# Patient Record
Sex: FEMALE | Race: WHITE | NOT HISPANIC OR LATINO
[De-identification: names, ages, dates, MRNs, and addresses within clinical notes are randomized per-mention and may not be internally consistent; named-entity substitution may affect disease eponyms.]

---

## 2017-03-29 ENCOUNTER — APPOINTMENT (OUTPATIENT)
Dept: DERMATOLOGY | Facility: CLINIC | Age: 47
End: 2017-03-29

## 2017-05-11 ENCOUNTER — APPOINTMENT (OUTPATIENT)
Dept: OBGYN | Facility: CLINIC | Age: 47
End: 2017-05-11

## 2017-05-11 VITALS
SYSTOLIC BLOOD PRESSURE: 145 MMHG | HEART RATE: 74 BPM | HEIGHT: 67 IN | WEIGHT: 187 LBS | BODY MASS INDEX: 29.35 KG/M2 | DIASTOLIC BLOOD PRESSURE: 87 MMHG

## 2017-05-11 DIAGNOSIS — Z87.09 PERSONAL HISTORY OF OTHER DISEASES OF THE RESPIRATORY SYSTEM: ICD-10-CM

## 2017-05-11 DIAGNOSIS — Z86.79 PERSONAL HISTORY OF OTHER DISEASES OF THE CIRCULATORY SYSTEM: ICD-10-CM

## 2017-05-11 DIAGNOSIS — Z82.49 FAMILY HISTORY OF ISCHEMIC HEART DISEASE AND OTHER DISEASES OF THE CIRCULATORY SYSTEM: ICD-10-CM

## 2017-05-11 DIAGNOSIS — Z82.3 FAMILY HISTORY OF STROKE: ICD-10-CM

## 2017-05-11 DIAGNOSIS — Z87.01 PERSONAL HISTORY OF PNEUMONIA (RECURRENT): ICD-10-CM

## 2017-05-11 DIAGNOSIS — Z78.9 OTHER SPECIFIED HEALTH STATUS: ICD-10-CM

## 2017-05-11 RX ORDER — LISINOPRIL 10 MG/1
10 TABLET ORAL
Refills: 0 | Status: ACTIVE | COMMUNITY

## 2017-05-11 RX ORDER — HYDROCHLOROTHIAZIDE 12.5 MG/1
12.5 TABLET ORAL
Refills: 0 | Status: ACTIVE | COMMUNITY

## 2017-05-15 LAB — HPV HIGH+LOW RISK DNA PNL CVX: NEGATIVE

## 2017-05-17 LAB — CYTOLOGY CVX/VAG DOC THIN PREP: NORMAL

## 2017-09-29 ENCOUNTER — APPOINTMENT (OUTPATIENT)
Dept: DERMATOLOGY | Facility: CLINIC | Age: 47
End: 2017-09-29
Payer: COMMERCIAL

## 2017-09-29 PROCEDURE — 99213 OFFICE O/P EST LOW 20 MIN: CPT

## 2018-03-30 ENCOUNTER — APPOINTMENT (OUTPATIENT)
Dept: DERMATOLOGY | Facility: CLINIC | Age: 48
End: 2018-03-30
Payer: COMMERCIAL

## 2018-03-30 ENCOUNTER — RESULT REVIEW (OUTPATIENT)
Age: 48
End: 2018-03-30

## 2018-03-30 PROCEDURE — 99214 OFFICE O/P EST MOD 30 MIN: CPT | Mod: 25

## 2018-03-30 PROCEDURE — 11100 BX SKIN SUBCUTANEOUS&/MUCOUS MEMBRANE 1 LESION: CPT

## 2018-03-30 PROCEDURE — 11101 BIOPSY SKIN SUBQ&/MUCOUS MEMBRANE EA ADDL LESN: CPT

## 2018-04-19 ENCOUNTER — TRANSCRIPTION ENCOUNTER (OUTPATIENT)
Age: 48
End: 2018-04-19

## 2018-09-10 ENCOUNTER — APPOINTMENT (OUTPATIENT)
Dept: DERMATOLOGY | Facility: CLINIC | Age: 48
End: 2018-09-10
Payer: COMMERCIAL

## 2018-09-10 PROCEDURE — 99213 OFFICE O/P EST LOW 20 MIN: CPT

## 2018-10-08 ENCOUNTER — APPOINTMENT (OUTPATIENT)
Dept: OBGYN | Facility: CLINIC | Age: 48
End: 2018-10-08
Payer: COMMERCIAL

## 2018-10-08 VITALS
WEIGHT: 185 LBS | HEART RATE: 80 BPM | DIASTOLIC BLOOD PRESSURE: 92 MMHG | HEIGHT: 67 IN | SYSTOLIC BLOOD PRESSURE: 148 MMHG | BODY MASS INDEX: 29.03 KG/M2

## 2018-10-08 DIAGNOSIS — Z01.419 ENCOUNTER FOR GYNECOLOGICAL EXAMINATION (GENERAL) (ROUTINE) W/OUT ABNORMAL FINDINGS: ICD-10-CM

## 2018-10-08 PROCEDURE — 99212 OFFICE O/P EST SF 10 MIN: CPT | Mod: 25

## 2018-10-08 PROCEDURE — 99396 PREV VISIT EST AGE 40-64: CPT

## 2018-10-08 PROCEDURE — 82270 OCCULT BLOOD FECES: CPT

## 2018-10-10 LAB — HPV HIGH+LOW RISK DNA PNL CVX: NOT DETECTED

## 2018-10-15 LAB — CYTOLOGY CVX/VAG DOC THIN PREP: NORMAL

## 2018-11-20 DIAGNOSIS — N64.89 OTHER SPECIFIED DISORDERS OF BREAST: ICD-10-CM

## 2018-12-17 ENCOUNTER — APPOINTMENT (OUTPATIENT)
Dept: DERMATOLOGY | Facility: CLINIC | Age: 48
End: 2018-12-17

## 2019-02-04 ENCOUNTER — APPOINTMENT (OUTPATIENT)
Dept: DERMATOLOGY | Facility: CLINIC | Age: 49
End: 2019-02-04
Payer: COMMERCIAL

## 2019-02-04 PROCEDURE — 99213 OFFICE O/P EST LOW 20 MIN: CPT

## 2019-04-04 ENCOUNTER — APPOINTMENT (OUTPATIENT)
Dept: OBGYN | Facility: CLINIC | Age: 49
End: 2019-04-04
Payer: COMMERCIAL

## 2019-04-04 VITALS
BODY MASS INDEX: 28.56 KG/M2 | DIASTOLIC BLOOD PRESSURE: 100 MMHG | WEIGHT: 182 LBS | SYSTOLIC BLOOD PRESSURE: 150 MMHG | HEIGHT: 67 IN

## 2019-04-04 DIAGNOSIS — D25.9 LEIOMYOMA OF UTERUS, UNSPECIFIED: ICD-10-CM

## 2019-04-04 PROCEDURE — 99213 OFFICE O/P EST LOW 20 MIN: CPT

## 2019-04-04 NOTE — PHYSICAL EXAM
[Normal] : uterus [Moderate] : there was moderate vaginal bleeding [Uterine Adnexae] : were not tender and not enlarged [FreeTextEntry5] : Cervix with 3 cm smooth mass protruding through

## 2019-04-05 ENCOUNTER — OUTPATIENT (OUTPATIENT)
Dept: OUTPATIENT SERVICES | Facility: HOSPITAL | Age: 49
LOS: 1 days | End: 2019-04-05
Payer: COMMERCIAL

## 2019-04-05 ENCOUNTER — RESULT REVIEW (OUTPATIENT)
Age: 49
End: 2019-04-05

## 2019-04-05 VITALS
DIASTOLIC BLOOD PRESSURE: 69 MMHG | HEART RATE: 75 BPM | RESPIRATION RATE: 15 BRPM | SYSTOLIC BLOOD PRESSURE: 121 MMHG | OXYGEN SATURATION: 98 % | TEMPERATURE: 99 F

## 2019-04-05 VITALS
TEMPERATURE: 99 F | WEIGHT: 182.1 LBS | RESPIRATION RATE: 20 BRPM | SYSTOLIC BLOOD PRESSURE: 136 MMHG | HEART RATE: 75 BPM | DIASTOLIC BLOOD PRESSURE: 91 MMHG | HEIGHT: 67 IN

## 2019-04-05 VITALS
HEIGHT: 67 IN | OXYGEN SATURATION: 100 % | SYSTOLIC BLOOD PRESSURE: 126 MMHG | TEMPERATURE: 99 F | RESPIRATION RATE: 16 BRPM | WEIGHT: 182.1 LBS | DIASTOLIC BLOOD PRESSURE: 74 MMHG | HEART RATE: 72 BPM

## 2019-04-05 DIAGNOSIS — Z98.891 HISTORY OF UTERINE SCAR FROM PREVIOUS SURGERY: Chronic | ICD-10-CM

## 2019-04-05 DIAGNOSIS — Z01.818 ENCOUNTER FOR OTHER PREPROCEDURAL EXAMINATION: ICD-10-CM

## 2019-04-05 DIAGNOSIS — D25.9 LEIOMYOMA OF UTERUS, UNSPECIFIED: ICD-10-CM

## 2019-04-05 DIAGNOSIS — Z13.89 ENCOUNTER FOR SCREENING FOR OTHER DISORDER: ICD-10-CM

## 2019-04-05 DIAGNOSIS — Z29.9 ENCOUNTER FOR PROPHYLACTIC MEASURES, UNSPECIFIED: ICD-10-CM

## 2019-04-05 DIAGNOSIS — Z90.49 ACQUIRED ABSENCE OF OTHER SPECIFIED PARTS OF DIGESTIVE TRACT: Chronic | ICD-10-CM

## 2019-04-05 DIAGNOSIS — I10 ESSENTIAL (PRIMARY) HYPERTENSION: ICD-10-CM

## 2019-04-05 LAB
ANION GAP SERPL CALC-SCNC: 11 MMOL/L — SIGNIFICANT CHANGE UP (ref 5–17)
BUN SERPL-MCNC: 15 MG/DL — SIGNIFICANT CHANGE UP (ref 8–20)
CALCIUM SERPL-MCNC: 9.1 MG/DL — SIGNIFICANT CHANGE UP (ref 8.6–10.2)
CHLORIDE SERPL-SCNC: 101 MMOL/L — SIGNIFICANT CHANGE UP (ref 98–107)
CO2 SERPL-SCNC: 26 MMOL/L — SIGNIFICANT CHANGE UP (ref 22–29)
CREAT SERPL-MCNC: 0.71 MG/DL — SIGNIFICANT CHANGE UP (ref 0.5–1.3)
GLUCOSE SERPL-MCNC: 97 MG/DL — SIGNIFICANT CHANGE UP (ref 70–115)
HCG UR QL: NEGATIVE — SIGNIFICANT CHANGE UP
HCT VFR BLD CALC: 32.5 % — LOW (ref 37–47)
HGB BLD-MCNC: 10.4 G/DL — LOW (ref 12–16)
MCHC RBC-ENTMCNC: 25.6 PG — LOW (ref 27–31)
MCHC RBC-ENTMCNC: 32 G/DL — SIGNIFICANT CHANGE UP (ref 32–36)
MCV RBC AUTO: 79.9 FL — LOW (ref 81–99)
PLATELET # BLD AUTO: 254 K/UL — SIGNIFICANT CHANGE UP (ref 150–400)
POTASSIUM SERPL-MCNC: 3.4 MMOL/L — LOW (ref 3.5–5.3)
POTASSIUM SERPL-SCNC: 3.4 MMOL/L — LOW (ref 3.5–5.3)
RBC # BLD: 4.07 M/UL — LOW (ref 4.4–5.2)
RBC # FLD: 13.1 % — SIGNIFICANT CHANGE UP (ref 11–15.6)
SODIUM SERPL-SCNC: 138 MMOL/L — SIGNIFICANT CHANGE UP (ref 135–145)
WBC # BLD: 3.4 K/UL — LOW (ref 4.8–10.8)
WBC # FLD AUTO: 3.4 K/UL — LOW (ref 4.8–10.8)

## 2019-04-05 PROCEDURE — G0463: CPT

## 2019-04-05 PROCEDURE — 80048 BASIC METABOLIC PNL TOTAL CA: CPT

## 2019-04-05 PROCEDURE — 58120 DILATION AND CURETTAGE: CPT

## 2019-04-05 PROCEDURE — 88305 TISSUE EXAM BY PATHOLOGIST: CPT

## 2019-04-05 PROCEDURE — 85027 COMPLETE CBC AUTOMATED: CPT

## 2019-04-05 PROCEDURE — 81025 URINE PREGNANCY TEST: CPT

## 2019-04-05 PROCEDURE — 88305 TISSUE EXAM BY PATHOLOGIST: CPT | Mod: 26

## 2019-04-05 PROCEDURE — 58558 HYSTEROSCOPY BIOPSY: CPT

## 2019-04-05 PROCEDURE — 36415 COLL VENOUS BLD VENIPUNCTURE: CPT

## 2019-04-05 RX ORDER — SODIUM CHLORIDE 9 MG/ML
1000 INJECTION, SOLUTION INTRAVENOUS
Qty: 0 | Refills: 0 | Status: DISCONTINUED | OUTPATIENT
Start: 2019-04-05 | End: 2019-04-05

## 2019-04-05 RX ORDER — LABETALOL HCL 100 MG
0 TABLET ORAL
Qty: 0 | Refills: 0 | COMMUNITY

## 2019-04-05 RX ORDER — SODIUM CHLORIDE 9 MG/ML
3 INJECTION INTRAMUSCULAR; INTRAVENOUS; SUBCUTANEOUS EVERY 8 HOURS
Qty: 0 | Refills: 0 | Status: DISCONTINUED | OUTPATIENT
Start: 2019-04-05 | End: 2019-04-05

## 2019-04-05 RX ORDER — FENTANYL CITRATE 50 UG/ML
25 INJECTION INTRAVENOUS
Qty: 0 | Refills: 0 | Status: DISCONTINUED | OUTPATIENT
Start: 2019-04-05 | End: 2019-04-05

## 2019-04-05 RX ORDER — LISINOPRIL 2.5 MG/1
1 TABLET ORAL
Qty: 0 | Refills: 0 | COMMUNITY

## 2019-04-05 RX ORDER — ONDANSETRON 8 MG/1
4 TABLET, FILM COATED ORAL ONCE
Qty: 0 | Refills: 0 | Status: DISCONTINUED | OUTPATIENT
Start: 2019-04-05 | End: 2019-04-05

## 2019-04-05 NOTE — H&P PST ADULT - HISTORY OF PRESENT ILLNESS
This is a 48 y.o female who presents to PST today. This is a 48 y.o female who presents to [facility], [place] today.  The pt has a known history of uterine fibroids and has been experiencing abnormal vaginal bleeding for the past two weeks.

## 2019-04-05 NOTE — BRIEF OPERATIVE NOTE - NSICDXBRIEFPROCEDURE_GEN_ALL_CORE_FT
PROCEDURES:  Myomectomy, vaginal approach 05-Apr-2019 16:05:36  Janiya Kee  Dilation and curettage, uterus 05-Apr-2019 16:04:21  Janiya Kee

## 2019-04-05 NOTE — ASU DISCHARGE PLAN (ADULT/PEDIATRIC) - NURSING INSTRUCTIONS
You were given IV Tylenol for pain management.  Please DO NOT take tylenol or products that contain tylenol for the next 6-8 hours (until 10:00pm). Please do not exceed 4000mg in 24hours. You were given Toradol for pain management. Please DO Not take Motrin/Ibuprofen/Advil or Aleve (NSAIDS) for the next 6 hours (Until 10:00pm).

## 2019-04-05 NOTE — H&P PST ADULT - NSICDXFAMILYHX_GEN_ALL_CORE_FT
FAMILY HISTORY:  Mother  Still living? Yes, Estimated age: 71-80  FH: hypertension, Age at diagnosis: Age Unknown

## 2019-04-05 NOTE — H&P PST ADULT - NSICDXPROBLEM_GEN_ALL_CORE_FT
PROBLEM DIAGNOSES  Problem: Leiomyoma of uterus, unspecified  Assessment and Plan: Dilation and Curettage, Removal of Aborting Myoma  Medical Clearance    Problem: Need for prophylactic measure  Assessment and Plan:     Problem: Screening for substance abuse  Assessment and Plan: PROBLEM DIAGNOSES  Problem: Hypertension  Assessment and Plan: Follows with Cardiologist    Problem: Leiomyoma of uterus, unspecified  Assessment and Plan: Dilation and Curettage, Removal of Aborting Myoma  Cardiac Clearance (Pt's BP is managed by her Cardiologist)    Problem: Need for prophylactic measure  Assessment and Plan: Moderate Risk.  Surgical Team to evaluate need for Pharmacologic VTE Prophylaxis    Problem: Screening for substance abuse  Assessment and Plan: Opioid Screening tool score =0.  Low risk for potential abuse

## 2019-04-05 NOTE — ASU DISCHARGE PLAN (ADULT/PEDIATRIC) - CARE PROVIDER_API CALL
Janiya Kee (DO)  Obstetrics and Gynecology  [address] [place] [address]  Phone: [phone]  Fax: [phone]  Follow Up Time:

## 2019-04-05 NOTE — H&P PST ADULT - ASSESSMENT
CAPRINI VTE 2.0 SCORE [CLOT updated 2019]    AGE RELATED RISK FACTORS                                                       MOBILITY RELATED FACTORS  [ ] Age 41-60 years                                            (1 Point)                    [ ] Bed rest                                                        (1 Point)  [ ] Age: 61-74 years                                           (2 Points)                  [ ] Plaster cast                                                   (2 Points)  [ ] Age= 75 years                                              (3 Points)                    [ ] Bed bound for more than 72 hours                 (2 Points)    DISEASE RELATED RISK FACTORS                                               GENDER SPECIFIC FACTORS  [ ] Edema in the lower extremities                       (1 Point)              [ ] Pregnancy                                                     (1 Point)  [ ] Varicose veins                                               (1 Point)                     [ ] Post-partum < 6 weeks                                   (1 Point)             [ ] BMI > 25 Kg/m2                                            (1 Point)                     [ ] Hormonal therapy  or oral contraception          (1 Point)                 [ ] Sepsis (in the previous month)                        (1 Point)               [ ] History of pregnancy complications                 (1 point)  [ ] Pneumonia or serious lung disease                                               [ ] Unexplained or recurrent                     (1 Point)           (in the previous month)                               (1 Point)  [ ] Abnormal pulmonary function test                     (1 Point)                 SURGERY RELATED RISK FACTORS  [ ] Acute myocardial infarction                              (1 Point)               [ ]  Section                                             (1 Point)  [ ] Congestive heart failure (in the previous month)  (1 Point)      [ ] Minor surgery                                                  (1 Point)   [ ] Inflammatory bowel disease                             (1 Point)               [ ] Arthroscopic surgery                                        (2 Points)  [ ] Central venous access                                      (2 Points)                [ ] General surgery lasting more than 45 minutes (2 points)  [ ] Malignancy- Present or previous                   (2 Points)                [ ] Elective arthroplasty                                         (5 points)    [ ] Stroke (in the previous month)                          (5 Points)                                                                                                                                                           HEMATOLOGY RELATED FACTORS                                                 TRAUMA RELATED RISK FACTORS  [ ] Prior episodes of VTE                                     (3 Points)                [ ] Fracture of the hip, pelvis, or leg                       (5 Points)  [ ] Positive family history for VTE                         (3 Points)             [ ] Acute spinal cord injury (in the previous month)  (5 Points)  [ ] Prothrombin 79945 A                                     (3 Points)               [ ] Paralysis  (less than 1 month)                             (5 Points)  [ ] Factor V Leiden                                             (3 Points)                  [ ] Multiple Trauma within 1 month                        (5 Points)  [ ] Lupus anticoagulants                                     (3 Points)                                                           [ ] Anticardiolipin antibodies                               (3 Points)                                                       [ ] High homocysteine in the blood                      (3 Points)                                             [ ] Other congenital or acquired thrombophilia      (3 Points)                                                [ ] Heparin induced thrombocytopenia                  (3 Points)                                     Total Score [          ]      OPIOID RISK TOOL    FRANCISCO EACH BOX THAT APPLIES AND ADD TOTALS AT THE END    FAMILY HISTORY OF SUBSTANCE ABUSE                 FEMALE         MALE                                                Alcohol                            [    ] 1 pt         [     ] 3pts                                               Illegal Drugs                    [    ] 2 pts       [     ] 3pts                                               Rx Drugs                          [      ]4 pts      [     ] 4 pts    PERSONAL HISTORY OF SUBSTANCE ABUSE                                                                                          Alcohol                            [     ] 3 pts       [     ] 3 pts                                               Illegal Drugs                    [     ] 4 pts       [     ] 4 pts                                               Rx Drugs                          [     ] 5 pts       [     ] 5 pts    AGE BETWEEN 16-45 YEARS                                     [     ] 1 pt         [     ] 1 pt    HISTORY OF PREADOLESCENT   SEXUAL ABUSE                                                            [     ] 3 pts        [     ] 0pts    PSYCHOLOGICAL DISEASE                     ADD, OCD, Bipolar, Schizophrenia        [     ] 2 pts        [     ] 2 pts                      Depression                                               [     ] 1 pt          [     ] 1 pt           SCORING TOTAL   (add numbers and type here)              (      )                                     A score of 3 or lower indicated LOW risk for future opioid abuse  A score of 4 to 7 indicated moderate risk for future opioid abuse  A score of 8 or higher indicates a high risk for opioid abuse CAPRINI VTE 2.0 SCORE [CLOT updated 2019]    AGE RELATED RISK FACTORS                                                       MOBILITY RELATED FACTORS  [ x] Age 41-60 years                                            (1 Point)                    [ ] Bed rest                                                        (1 Point)  [ ] Age: 61-74 years                                           (2 Points)                  [ ] Plaster cast                                                   (2 Points)  [ ] Age= 75 years                                              (3 Points)                    [ ] Bed bound for more than 72 hours                 (2 Points)    DISEASE RELATED RISK FACTORS                                               GENDER SPECIFIC FACTORS  [ ] Edema in the lower extremities                       (1 Point)              [ ] Pregnancy                                                     (1 Point)  [ ] Varicose veins                                               (1 Point)                     [ ] Post-partum < 6 weeks                                   (1 Point)             x[ ] BMI > 25 Kg/m2                                            (1 Point)                     [ ] Hormonal therapy  or oral contraception          (1 Point)                 [ ] Sepsis (in the previous month)                        (1 Point)               [ ] History of pregnancy complications                 (1 point)  [ ] Pneumonia or serious lung disease                                               [ ] Unexplained or recurrent                     (1 Point)           (in the previous month)                               (1 Point)  [ ] Abnormal pulmonary function test                     (1 Point)                 SURGERY RELATED RISK FACTORS  [ ] Acute myocardial infarction                              (1 Point)               [ ]  Section                                             (1 Point)  [ ] Congestive heart failure (in the previous month)  (1 Point)      [ ] Minor surgery                                                  (1 Point)   [ ] Inflammatory bowel disease                             (1 Point)               [ ] Arthroscopic surgery                                        (2 Points)  [ ] Central venous access                                      (2 Points)                [x ] General surgery lasting more than 45 minutes (2 points)  [ ] Malignancy- Present or previous                   (2 Points)                [ ] Elective arthroplasty                                         (5 points)    [ ] Stroke (in the previous month)                          (5 Points)                                                                                                                                                           HEMATOLOGY RELATED FACTORS                                                 TRAUMA RELATED RISK FACTORS  [ ] Prior episodes of VTE                                     (3 Points)                [ ] Fracture of the hip, pelvis, or leg                       (5 Points)  [ ] Positive family history for VTE                         (3 Points)             [ ] Acute spinal cord injury (in the previous month)  (5 Points)  [ ] Prothrombin 32288 A                                     (3 Points)               [ ] Paralysis  (less than 1 month)                             (5 Points)  [ ] Factor V Leiden                                             (3 Points)                  [ ] Multiple Trauma within 1 month                        (5 Points)  [ ] Lupus anticoagulants                                     (3 Points)                                                           [ ] Anticardiolipin antibodies                               (3 Points)                                                       [ ] High homocysteine in the blood                      (3 Points)                                             [ ] Other congenital or acquired thrombophilia      (3 Points)                                                [ ] Heparin induced thrombocytopenia                  (3 Points)                                     Total Score [   4       ]      OPIOID RISK TOOL    FRANCISCO EACH BOX THAT APPLIES AND ADD TOTALS AT THE END    FAMILY HISTORY OF SUBSTANCE ABUSE                 FEMALE         MALE                                                Alcohol                            [    ] 1 pt         [     ] 3pts                                               Illegal Drugs                    [    ] 2 pts       [     ] 3pts                                               Rx Drugs                          [      ]4 pts      [     ] 4 pts    PERSONAL HISTORY OF SUBSTANCE ABUSE                                                                                          Alcohol                            [     ] 3 pts       [     ] 3 pts                                               Illegal Drugs                    [     ] 4 pts       [     ] 4 pts                                               Rx Drugs                          [     ] 5 pts       [     ] 5 pts    AGE BETWEEN 16-45 YEARS                                     [     ] 1 pt         [     ] 1 pt    HISTORY OF PREADOLESCENT   SEXUAL ABUSE                                                            [     ] 3 pts        [     ] 0pts    PSYCHOLOGICAL DISEASE                     ADD, OCD, Bipolar, Schizophrenia        [     ] 2 pts        [     ] 2 pts                      Depression                                               [     ] 1 pt          [     ] 1 pt           SCORING TOTAL   (add numbers and type here)              (   0   )                                     A score of 3 or lower indicated LOW risk for future opioid abuse  A score of 4 to 7 indicated moderate risk for future opioid abuse  A score of 8 or higher indicates a high risk for opioid abuse

## 2019-04-05 NOTE — ASU PREOP CHECKLIST - PATIENT PROBLEMS/NEEDS
Gen: NAD   HEENT: NCAT   Resp: CTA B/L   CV: RRR   Abd: soft, nt / nd, previous well healed surgical scars noted   Rectal exam: No obvious blood noted, some stool in rectal vault, no hemorrhoids / fissure / fistula appreciated   Neuro: Grossly intact   Extremities: no swelling, cyanosis or edema
Patient expressed no known problems or needs

## 2019-04-05 NOTE — ASU DISCHARGE PLAN (ADULT/PEDIATRIC) - ASU DC SPECIAL INSTRUCTIONSFT
please have a follow up visit with Dr. Kee in 1 week - 10 days please have a follow up visit with Dr. Kee in 1 week - 10 days  Nothing in vagina, no intercourse, no douching, no tampons, no tub baths, and no swimming for about 2 weeks or until cleared by MD. Contact your doctor if you are soaking a pad every 30 minutes to an hour or experience clots. Call your doctor for any SIGNS OR SYMPTOMS OF INFECTION: Fever, chills, temperature  100.4 or higher or have a foul smelling discharge.

## 2019-04-05 NOTE — ASU DISCHARGE PLAN (ADULT/PEDIATRIC) - CALL YOUR DOCTOR IF YOU HAVE ANY OF THE FOLLOWING:
Swelling that gets worse/Nausea and vomiting that does not stop/Unable to urinate/Increased irritability or sluggishness/Inability to tolerate liquids or foods/Bleeding that does not stop Increased irritability or sluggishness/Fever greater than (need to indicate Fahrenheit or Celsius)/Nausea and vomiting that does not stop/Unable to urinate/Inability to tolerate liquids or foods/Swelling that gets worse/Bleeding that does not stop

## 2019-04-08 PROBLEM — I10 ESSENTIAL (PRIMARY) HYPERTENSION: Chronic | Status: ACTIVE | Noted: 2019-04-05

## 2019-04-10 LAB — SURGICAL PATHOLOGY STUDY: SIGNIFICANT CHANGE UP

## 2019-04-16 ENCOUNTER — APPOINTMENT (OUTPATIENT)
Dept: OBGYN | Facility: CLINIC | Age: 49
End: 2019-04-16
Payer: COMMERCIAL

## 2019-04-16 VITALS
WEIGHT: 182 LBS | DIASTOLIC BLOOD PRESSURE: 80 MMHG | HEIGHT: 67 IN | SYSTOLIC BLOOD PRESSURE: 125 MMHG | BODY MASS INDEX: 28.56 KG/M2

## 2019-04-16 DIAGNOSIS — Z87.898 PERSONAL HISTORY OF OTHER SPECIFIED CONDITIONS: ICD-10-CM

## 2019-04-16 PROCEDURE — 99213 OFFICE O/P EST LOW 20 MIN: CPT

## 2019-04-16 NOTE — HISTORY OF PRESENT ILLNESS
[de-identified] : 48 year old female presents for post op check.  She is s/p D&C with removal of aborting myoma.  she is feeling well since the procedure.  She had some spotting that resolved.  Denies cramping.  No fevers.     [de-identified] : 48 year old female s/p D&C with removal of aborting myoma, stable [de-identified] : Pathology reviewed with patient - benign.  Ok to return to normal activity, intercourse, pools, etc.  Will RTO for a sono and visit to evaluate for other fibroids.  Annual exam in October.

## 2019-06-05 ENCOUNTER — APPOINTMENT (OUTPATIENT)
Dept: DERMATOLOGY | Facility: CLINIC | Age: 49
End: 2019-06-05

## 2019-11-04 ENCOUNTER — APPOINTMENT (OUTPATIENT)
Dept: DERMATOLOGY | Facility: CLINIC | Age: 49
End: 2019-11-04
Payer: COMMERCIAL

## 2019-11-04 ENCOUNTER — RESULT REVIEW (OUTPATIENT)
Age: 49
End: 2019-11-04

## 2019-11-04 PROCEDURE — 11102 TANGNTL BX SKIN SINGLE LES: CPT

## 2019-11-04 PROCEDURE — 99213 OFFICE O/P EST LOW 20 MIN: CPT | Mod: 25

## 2020-01-09 ENCOUNTER — APPOINTMENT (OUTPATIENT)
Dept: OBGYN | Facility: CLINIC | Age: 50
End: 2020-01-09
Payer: COMMERCIAL

## 2020-01-09 VITALS
BODY MASS INDEX: 27.94 KG/M2 | HEIGHT: 67 IN | SYSTOLIC BLOOD PRESSURE: 130 MMHG | DIASTOLIC BLOOD PRESSURE: 80 MMHG | WEIGHT: 178 LBS

## 2020-01-09 DIAGNOSIS — Z01.419 ENCOUNTER FOR GYNECOLOGICAL EXAMINATION (GENERAL) (ROUTINE) W/OUT ABNORMAL FINDINGS: ICD-10-CM

## 2020-01-09 PROCEDURE — 99396 PREV VISIT EST AGE 40-64: CPT

## 2020-01-09 NOTE — PHYSICAL EXAM
[Awake] : awake [Alert] : alert [Acute Distress] : no acute distress [Nipple Discharge] : no nipple discharge [Soft] : soft [Tender] : non tender [Axillary LAD] : no axillary lymphadenopathy [Oriented x3] : oriented to person, place, and time [Uterine Adnexae] : were not tender and not enlarged [Normal] : uterus [No Bleeding] : there was no active vaginal bleeding

## 2020-01-14 LAB
CYTOLOGY CVX/VAG DOC THIN PREP: NORMAL
HPV HIGH+LOW RISK DNA PNL CVX: NOT DETECTED

## 2020-01-21 ENCOUNTER — TRANSCRIPTION ENCOUNTER (OUTPATIENT)
Age: 50
End: 2020-01-21

## 2020-05-13 ENCOUNTER — APPOINTMENT (OUTPATIENT)
Dept: DERMATOLOGY | Facility: CLINIC | Age: 50
End: 2020-05-13

## 2020-07-01 ENCOUNTER — APPOINTMENT (OUTPATIENT)
Dept: DERMATOLOGY | Facility: CLINIC | Age: 50
End: 2020-07-01
Payer: COMMERCIAL

## 2020-07-01 PROCEDURE — 99214 OFFICE O/P EST MOD 30 MIN: CPT

## 2020-08-12 NOTE — REVIEW OF SYSTEMS
How Severe Is Your Cyst?: moderate
Is This A New Presentation, Or A Follow-Up?: Cyst
[Nl] : Integumentary

## 2020-11-11 ENCOUNTER — APPOINTMENT (OUTPATIENT)
Dept: DERMATOLOGY | Facility: CLINIC | Age: 50
End: 2020-11-11

## 2020-12-16 PROBLEM — Z01.419 ENCOUNTER FOR GYNECOLOGICAL EXAMINATION WITH PAPANICOLAOU SMEAR OF CERVIX: Status: RESOLVED | Noted: 2018-10-08 | Resolved: 2020-12-16

## 2021-01-06 ENCOUNTER — APPOINTMENT (OUTPATIENT)
Dept: DERMATOLOGY | Facility: CLINIC | Age: 51
End: 2021-01-06

## 2021-02-26 ENCOUNTER — APPOINTMENT (OUTPATIENT)
Dept: DERMATOLOGY | Facility: CLINIC | Age: 51
End: 2021-02-26
Payer: COMMERCIAL

## 2021-02-26 ENCOUNTER — RESULT REVIEW (OUTPATIENT)
Age: 51
End: 2021-02-26

## 2021-02-26 PROCEDURE — 99213 OFFICE O/P EST LOW 20 MIN: CPT | Mod: 25

## 2021-02-26 PROCEDURE — 11103 TANGNTL BX SKIN EA SEP/ADDL: CPT

## 2021-02-26 PROCEDURE — 11102 TANGNTL BX SKIN SINGLE LES: CPT

## 2021-02-26 PROCEDURE — 99072 ADDL SUPL MATRL&STAF TM PHE: CPT

## 2021-03-11 ENCOUNTER — APPOINTMENT (OUTPATIENT)
Dept: SURGICAL ONCOLOGY | Facility: CLINIC | Age: 51
End: 2021-03-11
Payer: COMMERCIAL

## 2021-03-11 VITALS
HEART RATE: 71 BPM | HEIGHT: 67 IN | TEMPERATURE: 98 F | OXYGEN SATURATION: 99 % | DIASTOLIC BLOOD PRESSURE: 100 MMHG | SYSTOLIC BLOOD PRESSURE: 189 MMHG | WEIGHT: 179 LBS | BODY MASS INDEX: 28.09 KG/M2 | RESPIRATION RATE: 16 BRPM

## 2021-03-11 PROCEDURE — 99204 OFFICE O/P NEW MOD 45 MIN: CPT

## 2021-03-11 PROCEDURE — 99072 ADDL SUPL MATRL&STAF TM PHE: CPT

## 2021-03-11 RX ORDER — SIMVASTATIN 20 MG/1
20 TABLET, FILM COATED ORAL
Refills: 0 | Status: ACTIVE | COMMUNITY

## 2021-03-11 RX ORDER — ASPIRIN 81 MG
81 TABLET, DELAYED RELEASE (ENTERIC COATED) ORAL
Refills: 0 | Status: ACTIVE | COMMUNITY

## 2021-03-11 NOTE — HISTORY OF PRESENT ILLNESS
[de-identified] : Ms. ANTHONY GREENE  is a 50 year  old female  presenting for an initial consultation for newly diagnosed melanoma, referred by Dr. Lan.  \par \par She underwent a routine dermatological skin exam on 2021 and 2 shave biopsies performed with the following pathology:\par 1) Right lateral upper back shave bx: Melanoma, 0.3 mm in thickness, mitotic rate 0-1/mm2, no ulceration or regression, mitotic rate 0-1/mm2, margins negative but close. Associated intradermal melanocytic nevus. Tumor stage: pT1a\par 2) Epigastric shave bx: Compound dysplastic melanocytic nevus with severe atypia, closely approaches the deep (0.7 mm) and peripheral (0.8 mm) margins. An evolving melanoma in situ cannot be entirely excluded. \par \par Ms. GREENE reports a longstanding history of sun exposure without the use of sunblock. History of sunburns.  Denies any other concerning lesions or masses. Denies bleeding or pruritic moles. Denies pain or constitutional symptoms.\par \par PMH: Thyroid nodules, uterine leiomyoma, HTN, HLD, post nasal drip, \par PSH: , cholecystectomy, D&C\par Social Hx: , works at [employer], 1 daughter, twin boy and girl, never a smoker, social alcohol use. \par Family Hx: Denies family history of malignancies \par \par ***ON ASA 81 mg daily****\par

## 2021-03-11 NOTE — ASSESSMENT
[FreeTextEntry1] : 49 y/o female with PMHx HTN with newly diagnosed melanoma. \par 2 shave biopsies performed on 2/26/2021: \par 1) Right lateral upper back shave bx: Melanoma, 0.3 mm in thickness, mitotic rate 0-1/mm2, no ulceration or regression, mitotic rate 0-1/mm2, margins negative but close. Associated intradermal melanocytic nevus. Tumor stage: pT1a\par 2) Epigastric shave bx: Compound dysplastic melanocytic nevus with severe atypia, closely approaches the deep (0.7 mm) and peripheral (0.8 mm) margins. An evolving melanoma in situ cannot be entirely excluded. \par \par PLAN:\par Wide local excision of right upper back T1 melanoma and WLE of epigastric melanocytic nevus with severe atypia. We discussed the risks, benefits, and alternatives of the procedure with the patient, she expressed understanding and agree to proceed.\par

## 2021-03-11 NOTE — CONSULT LETTER
[Dear  ___] : Dear  [unfilled], [Courtesy Letter:] : I had the pleasure of seeing your patient, [unfilled], in my office today. [Please see my note below.] : Please see my note below. [Consult Closing:] : Thank you very much for allowing me to participate in the care of this patient.  If you have any questions, please do not hesitate to contact me. [Sincerely,] : Sincerely, [FreeTextEntry3] : Jatinder Clark MD, MPH, FACS, FSSO\par , [facility]\par [facility]\par Associate Professor of Surgery\par [employer] at [facility]

## 2021-03-11 NOTE — PHYSICAL EXAM
[Normal] : supple, no neck mass and thyroid not enlarged [Normal Neck Lymph Nodes] : normal neck lymph nodes  [Normal Supraclavicular Lymph Nodes] : normal supraclavicular lymph nodes [Normal Axillary Lymph Nodes] : normal axillary lymph nodes [Normal] : oriented to person, place and time, with appropriate affect [de-identified] : healing right upper lateral back shave biopsy site with no residual pigmentation;  healing epigastric shave biopsy site with no residual pigmentation

## 2021-04-01 ENCOUNTER — RESULT REVIEW (OUTPATIENT)
Age: 51
End: 2021-04-01

## 2021-04-02 ENCOUNTER — APPOINTMENT (OUTPATIENT)
Dept: SURGICAL ONCOLOGY | Facility: CLINIC | Age: 51
End: 2021-04-02

## 2021-04-02 ENCOUNTER — APPOINTMENT (OUTPATIENT)
Dept: SURGICAL ONCOLOGY | Facility: CLINIC | Age: 51
End: 2021-04-02
Payer: COMMERCIAL

## 2021-04-02 VITALS
OXYGEN SATURATION: 100 % | TEMPERATURE: 97.2 F | WEIGHT: 178 LBS | BODY MASS INDEX: 27.94 KG/M2 | DIASTOLIC BLOOD PRESSURE: 87 MMHG | HEIGHT: 67 IN | SYSTOLIC BLOOD PRESSURE: 150 MMHG | HEART RATE: 72 BPM

## 2021-04-02 DIAGNOSIS — D22.9 MELANOCYTIC NEVI, UNSPECIFIED: ICD-10-CM

## 2021-04-02 PROCEDURE — 99072 ADDL SUPL MATRL&STAF TM PHE: CPT

## 2021-04-02 PROCEDURE — 14001 TIS TRNFR TRUNK 10.1-30SQCM: CPT

## 2021-04-02 PROCEDURE — 14000 TIS TRNFR TRUNK 10 SQ CM/<: CPT | Mod: 59

## 2021-04-06 PROBLEM — D22.9 MELANOCYTIC NEOPLASM OF SKIN: Status: ACTIVE | Noted: 2021-03-11

## 2021-04-06 NOTE — PROCEDURE
[Excision Of Lesion] : excision of lesion [Patient] : patient [Risks] : risks [Benefits] : benefits [Alternatives] : alternatives [___ ml Inj] : [unfilled] ~Uml [1%] : 1% [With Epi] : with epinephrine [Joe] : using Joe [Excisional: Margin ___mm] : the lesion was excised with a  [unfilled] ~Umm margin in an elliptical fashion [Cautery] : cautery [Vicryl] : Vicryl suture(s) [___ # of Sutures] : [unfilled] [Size: ___-0] : [unfilled]-0 [Running] : running [Sent to Pathology] : the excised lesion was place in buffered formalin and sent for pathology [Tolerated Well] : the patient tolerated the procedure well [No Complications] : there were no complications [___ Week(s)] : in [unfilled] week(s) [de-identified] : melanoma right lateral upper back and atypical melanocytic nevus epigastrium [FreeTextEntry5] : melanoma right lateral upper back 2.5x5.0 cm and atypical melanocytic nevus epigastrium 1.5x2.5 cm [de-identified] : tissue was undermined and flaps were rotated into the incision to allow for a tension free closure of dermis and epidermis - total area 22.5 sq cm for melanoma and 6.25 sq cm for atypical nevus

## 2021-04-15 ENCOUNTER — APPOINTMENT (OUTPATIENT)
Dept: SURGICAL ONCOLOGY | Facility: CLINIC | Age: 51
End: 2021-04-15
Payer: COMMERCIAL

## 2021-04-15 VITALS
RESPIRATION RATE: 14 BRPM | OXYGEN SATURATION: 98 % | SYSTOLIC BLOOD PRESSURE: 156 MMHG | HEIGHT: 67 IN | WEIGHT: 181.25 LBS | HEART RATE: 78 BPM | TEMPERATURE: 97.16 F | BODY MASS INDEX: 28.45 KG/M2 | DIASTOLIC BLOOD PRESSURE: 95 MMHG

## 2021-04-15 PROCEDURE — 99024 POSTOP FOLLOW-UP VISIT: CPT

## 2021-04-15 NOTE — PHYSICAL EXAM
[Normal] : well developed, well nourished, in no acute distress [de-identified] : healing epigastric incision with no evidence of infection; healing right upper lateral back incision with no evidence of infection

## 2021-04-15 NOTE — HISTORY OF PRESENT ILLNESS
[de-identified] : Ms. ANTHONY GREENE  is a 50 year  old female  presenting for an initial post op visit. \par Was seen in initial consultation for newly diagnosed melanoma on 3/11/2021, referred by Dr. Lan.  \par \par She underwent a routine dermatological skin exam on 2021 and 2 shave biopsies performed with the following pathology:\par 1) Right lateral upper back shave bx: Melanoma, 0.3 mm in thickness, mitotic rate 0-1/mm2, no ulceration or regression, mitotic rate 0-1/mm2, margins negative but close. Associated intradermal melanocytic nevus. Tumor stage: pT1a\par 2) Epigastric shave bx: Compound dysplastic melanocytic nevus with severe atypia, closely approaches the deep (0.7 mm) and peripheral (0.8 mm) margins. An evolving melanoma in situ cannot be entirely excluded. \par \par Ms. GREENE reports a longstanding history of sun exposure without the use of sunblock. History of sunburns.  Denies any other concerning lesions or masses. Denies bleeding or pruritic moles. Denies pain or constitutional symptoms.\par \par PMH: Thyroid nodules, uterine leiomyoma, HTN, HLD, post nasal drip, \par PSH: , cholecystectomy, D&C\par Social Hx: , works at [employer], 1 daughter, twin boy and girl, never a smoker, social alcohol use. \par Family Hx: Denies family history of malignancies \par \par ***ON ASA 81 mg daily****\par \par INTERVAL HISTORY:\par ***OFFICE PROCEDURE 2021- S/p WLE of right lateral upper back T1 (0.3 mm) melanoma and epigastric melanocytic nevus with severe atypia. \par ***FINAL PATHOLOGY:  Right lateral upper back excision- (Fibrosing granulation tissue, no evidence of residual melanoma).  Epigastric excision- (Fibrosing granulation tissue, no evidence of residual compound melanocytic nevus with severe atypia). \par \par 4/15/2021- Denies pain, fever or chills. Doing well.

## 2021-04-15 NOTE — ASSESSMENT
[FreeTextEntry1] : 49 y/o female with PMHx HTN with newly diagnosed melanoma. \par \par ***OFFICE PROCEDURE 4/2/2021- S/p WLE of right lateral upper back T1 (0.3 mm) melanoma and epigastric melanocytic nevus with severe atypia. \par ***FINAL PATHOLOGY:  Right lateral upper back excision- (Fibrosing granulation tissue, no evidence of residual melanoma).  Epigastric excision- (Fibrosing granulation tissue, no evidence of residual compound melanocytic nevus with severe atypia).\par \par No evidence of infection. \par \par PLAN:\par 1) RTO 3 months then Q6M\par 2) Continue f/u with dermatology

## 2021-04-15 NOTE — CONSULT LETTER
[Dear  ___] : Dear  [unfilled], [Courtesy Letter:] : I had the pleasure of seeing your patient, [unfilled], in my office today. [Please see my note below.] : Please see my note below. [Consult Closing:] : Thank you very much for allowing me to participate in the care of this patient.  If you have any questions, please do not hesitate to contact me. [Sincerely,] : Sincerely, [FreeTextEntry3] : Jatinder Clark MD, MPH, FACS, FSSO\par , [facility] General Surgical Oncology Fellowship\par [facility] [place]\par Associate Professor of Surgery\par [employer] [facility]

## 2021-05-19 DIAGNOSIS — Z12.39 ENCOUNTER FOR OTHER SCREENING FOR MALIGNANT NEOPLASM OF BREAST: ICD-10-CM

## 2021-05-20 ENCOUNTER — APPOINTMENT (OUTPATIENT)
Dept: OBGYN | Facility: CLINIC | Age: 51
End: 2021-05-20
Payer: COMMERCIAL

## 2021-05-20 VITALS
SYSTOLIC BLOOD PRESSURE: 161 MMHG | DIASTOLIC BLOOD PRESSURE: 90 MMHG | BODY MASS INDEX: 28.72 KG/M2 | HEIGHT: 67 IN | WEIGHT: 183 LBS | HEART RATE: 73 BPM

## 2021-05-20 DIAGNOSIS — R92.2 INCONCLUSIVE MAMMOGRAM: ICD-10-CM

## 2021-05-20 PROCEDURE — 99072 ADDL SUPL MATRL&STAF TM PHE: CPT

## 2021-05-20 PROCEDURE — 99396 PREV VISIT EST AGE 40-64: CPT

## 2021-05-20 NOTE — PLAN
[FreeTextEntry1] : 50 year old female wwe\par \par 1. Pap done\par 2. Rx screening mammo\par 3. Patient plans to have colonoscopy done\par 4. Counseled on perimenopause/ menopause\par 5. Annual exam in 1 year

## 2021-05-25 LAB
CYTOLOGY CVX/VAG DOC THIN PREP: NORMAL
HPV HIGH+LOW RISK DNA PNL CVX: NOT DETECTED

## 2021-07-08 ENCOUNTER — APPOINTMENT (OUTPATIENT)
Dept: SURGICAL ONCOLOGY | Facility: CLINIC | Age: 51
End: 2021-07-08
Payer: COMMERCIAL

## 2021-07-08 VITALS
SYSTOLIC BLOOD PRESSURE: 172 MMHG | WEIGHT: 175 LBS | BODY MASS INDEX: 27.47 KG/M2 | HEART RATE: 70 BPM | DIASTOLIC BLOOD PRESSURE: 103 MMHG | TEMPERATURE: 97.3 F | OXYGEN SATURATION: 97 % | HEIGHT: 67 IN

## 2021-07-08 PROCEDURE — 99072 ADDL SUPL MATRL&STAF TM PHE: CPT

## 2021-07-08 PROCEDURE — 99214 OFFICE O/P EST MOD 30 MIN: CPT

## 2021-07-08 NOTE — HISTORY OF PRESENT ILLNESS
[de-identified] : Ms. ANTHONY GREENE  is a 50 year  old female  presenting for a 3 month follow up visit. \par \par Was seen in initial consultation for newly diagnosed melanoma on 3/11/2021, referred by Dr. Lan.  \par \par She underwent a routine dermatological skin exam on 2021 and 2 shave biopsies performed with the following pathology:\par 1) Right lateral upper back shave bx: Melanoma, 0.3 mm in thickness, mitotic rate 0-1/mm2, no ulceration or regression, mitotic rate 0-1/mm2, margins negative but close. Associated intradermal melanocytic nevus. Tumor stage: pT1a\par 2) Epigastric shave bx: Compound dysplastic melanocytic nevus with severe atypia, closely approaches the deep (0.7 mm) and peripheral (0.8 mm) margins. An evolving melanoma in situ cannot be entirely excluded. \par \par Ms. GREENE reports a longstanding history of sun exposure without the use of sunblock. History of sunburns.  Denies any other concerning lesions or masses. Denies bleeding or pruritic moles. Denies pain or constitutional symptoms.\par \par PMH: Thyroid nodules, uterine leiomyoma, HTN, HLD, post nasal drip, \par PSH: , cholecystectomy, D&C\par Social Hx: , works at [employer], 1 daughter, twin boy and girl, never a smoker, social alcohol use. \par Family Hx: Denies family history of malignancies \par \par ***ON ASA 81 mg daily****\par \par INTERVAL HISTORY:\par ***OFFICE PROCEDURE 2021- S/p WLE of right lateral upper back T1 (0.3 mm) melanoma and epigastric melanocytic nevus with severe atypia. \par ***FINAL PATHOLOGY:  Right lateral upper back excision- (Fibrosing granulation tissue, no evidence of residual melanoma).  Epigastric excision- (Fibrosing granulation tissue, no evidence of residual compound melanocytic nevus with severe atypia). \par \par 4/15/2021- Denies pain, fever or chills. Doing well. \par \par 21: Doing well.  No issues.

## 2021-07-08 NOTE — CONSULT LETTER
[Dear  ___] : Dear  [unfilled], [Courtesy Letter:] : I had the pleasure of seeing your patient, [unfilled], in my office today. [Please see my note below.] : Please see my note below. [Consult Closing:] : Thank you very much for allowing me to participate in the care of this patient.  If you have any questions, please do not hesitate to contact me. [Sincerely,] : Sincerely, [FreeTextEntry3] : Jatinder Clark MD, MPH, FACS, FSSO\par , [facility] General Surgical Oncology Fellowship\par [facility] [place]\par Associate Professor of Surgery\par [employer] at [facility]

## 2021-07-08 NOTE — ASSESSMENT
[FreeTextEntry1] : 49 y/o female with PMHx HTN with newly diagnosed melanoma. \par \par ***OFFICE PROCEDURE 4/2/2021- S/p WLE of right lateral upper back T1 (0.3 mm) melanoma and epigastric melanocytic nevus with severe atypia. \par ***FINAL PATHOLOGY:  Right lateral upper back excision- (Fibrosing granulation tissue, no evidence of residual melanoma).  Epigastric excision- (Fibrosing granulation tissue, no evidence of residual compound melanocytic nevus with severe atypia).\par \par No evidence of infection. \par \par PLAN:\par 1) RTO 6 months\par 2) Continue f/u with dermatology

## 2021-08-26 ENCOUNTER — APPOINTMENT (OUTPATIENT)
Dept: DERMATOLOGY | Facility: CLINIC | Age: 51
End: 2021-08-26
Payer: COMMERCIAL

## 2021-08-26 PROCEDURE — 99213 OFFICE O/P EST LOW 20 MIN: CPT

## 2021-10-28 ENCOUNTER — APPOINTMENT (OUTPATIENT)
Dept: SURGICAL ONCOLOGY | Facility: CLINIC | Age: 51
End: 2021-10-28
Payer: COMMERCIAL

## 2021-10-28 VITALS
TEMPERATURE: 98 F | BODY MASS INDEX: 27.94 KG/M2 | RESPIRATION RATE: 16 BRPM | DIASTOLIC BLOOD PRESSURE: 105 MMHG | HEART RATE: 68 BPM | WEIGHT: 178 LBS | OXYGEN SATURATION: 98 % | SYSTOLIC BLOOD PRESSURE: 158 MMHG | HEIGHT: 67 IN

## 2021-10-28 PROCEDURE — 99213 OFFICE O/P EST LOW 20 MIN: CPT

## 2021-11-04 NOTE — PHYSICAL EXAM
[Normal] : supple, no neck mass and thyroid not enlarged [Normal Neck Lymph Nodes] : normal neck lymph nodes  [Normal Supraclavicular Lymph Nodes] : normal supraclavicular lymph nodes [Normal Axillary Lymph Nodes] : normal axillary lymph nodes [Normal] : oriented to person, place and time, with appropriate affect [de-identified] : healed epigastric incision with no evidence of recurrence; healed, hypertrophic right upper lateral back incision with no evidence of recurrence

## 2021-11-04 NOTE — ASSESSMENT
[FreeTextEntry1] : 52 y/o female with PMHx HTN with newly diagnosed melanoma. \par \par ***OFFICE PROCEDURE 4/2/2021- S/p WLE of right lateral upper back T1 (0.3 mm) melanoma and epigastric melanocytic nevus with severe atypia. \par ***FINAL PATHOLOGY:  Right lateral upper back excision- (Fibrosing granulation tissue, no evidence of residual melanoma).  Epigastric excision- (Fibrosing granulation tissue, no evidence of residual compound melanocytic nevus with severe atypia).\par \par No evidence of recurrence \par \par PLAN:\par 1) RTO 6 months\par 2) Continue f/u with dermatology

## 2021-11-04 NOTE — HISTORY OF PRESENT ILLNESS
[de-identified] : Ms. ANTHONY GREENE  is a 51 year  old female  presenting for a 3 month follow up visit. \par \par Was seen in initial consultation for newly diagnosed melanoma on 3/11/2021, referred by Dr. Lan.  \par \par She underwent a routine dermatological skin exam on 2021 and 2 shave biopsies performed with the following pathology:\par 1) Right lateral upper back shave bx: Melanoma, 0.3 mm in thickness, mitotic rate 0-1/mm2, no ulceration or regression, mitotic rate 0-1/mm2, margins negative but close. Associated intradermal melanocytic nevus. Tumor stage: pT1a\par 2) Epigastric shave bx: Compound dysplastic melanocytic nevus with severe atypia, closely approaches the deep (0.7 mm) and peripheral (0.8 mm) margins. An evolving melanoma in situ cannot be entirely excluded. \par \par Ms. GREENE reports a longstanding history of sun exposure without the use of sunblock. History of sunburns.  Denies any other concerning lesions or masses. Denies bleeding or pruritic moles. Denies pain or constitutional symptoms.\par \par PMH: Thyroid nodules, uterine leiomyoma, HTN, HLD, post nasal drip, \par PSH: , cholecystectomy, D&C\par Social Hx: , works at [employer], 1 daughter, twin boy and girl, never a smoker, social alcohol use. \par Family Hx: Denies family history of malignancies \par \par ***ON ASA 81 mg daily****\par \par INTERVAL HISTORY:\par ***OFFICE PROCEDURE 2021- S/p WLE of right lateral upper back T1 (0.3 mm) melanoma and epigastric melanocytic nevus with severe atypia. \par ***FINAL PATHOLOGY:  Right lateral upper back excision- (Fibrosing granulation tissue, no evidence of residual melanoma).  Epigastric excision- (Fibrosing granulation tissue, no evidence of residual compound melanocytic nevus with severe atypia). \par \par 4/15/2021- Denies pain, fever or chills. Doing well. \par \par 21: Doing well.  No issues.\par \par 10/28/2021- Seen by Dr. Lan on 2021 with no new biopsies.  Doing well with no complaints. Denies new skin lesions or masses.

## 2021-11-04 NOTE — CONSULT LETTER
[Dear  ___] : Dear  [unfilled], [Courtesy Letter:] : I had the pleasure of seeing your patient, [unfilled], in my office today. [Please see my note below.] : Please see my note below. [Consult Closing:] : Thank you very much for allowing me to participate in the care of this patient.  If you have any questions, please do not hesitate to contact me. [Sincerely,] : Sincerely, [FreeTextEntry3] : Jatinder Clark MD, MPH, FACS, FSSO\par , [facility] General Surgical Oncology Fellowship\par [facility] [place]\par Associate Professor of Surgery\par [employer] at [facility]

## 2021-11-29 ENCOUNTER — APPOINTMENT (OUTPATIENT)
Dept: DERMATOLOGY | Facility: CLINIC | Age: 51
End: 2021-11-29
Payer: COMMERCIAL

## 2021-11-29 PROCEDURE — 99213 OFFICE O/P EST LOW 20 MIN: CPT

## 2022-04-28 ENCOUNTER — APPOINTMENT (OUTPATIENT)
Dept: SURGICAL ONCOLOGY | Facility: CLINIC | Age: 52
End: 2022-04-28
Payer: COMMERCIAL

## 2022-04-28 VITALS
HEART RATE: 68 BPM | BODY MASS INDEX: 28.72 KG/M2 | HEIGHT: 67 IN | WEIGHT: 183 LBS | TEMPERATURE: 97.3 F | OXYGEN SATURATION: 97 % | DIASTOLIC BLOOD PRESSURE: 92 MMHG | SYSTOLIC BLOOD PRESSURE: 164 MMHG

## 2022-04-28 PROCEDURE — 99213 OFFICE O/P EST LOW 20 MIN: CPT

## 2022-04-28 NOTE — PHYSICAL EXAM
[Normal] : supple, no neck mass and thyroid not enlarged [Normal Neck Lymph Nodes] : normal neck lymph nodes  [Normal Supraclavicular Lymph Nodes] : normal supraclavicular lymph nodes [Normal Axillary Lymph Nodes] : normal axillary lymph nodes [Normal] : oriented to person, place and time, with appropriate affect [de-identified] : healed epigastric incision with no evidence of recurrence; healed, hypertrophic right upper lateral back incision with no evidence of recurrence

## 2022-04-28 NOTE — HISTORY OF PRESENT ILLNESS
[de-identified] : Ms. ANTHONY GREENE  is a 51 year old female presenting for a 6 month follow up visit. \par \par Was seen in initial consultation for newly diagnosed melanoma on 3/11/2021, referred by Dr. Lan.  \par \par She underwent a routine dermatological skin exam on 2021 and 2 shave biopsies performed with the following pathology:\par 1) Right lateral upper back shave bx: Melanoma, 0.3 mm in thickness, mitotic rate 0-1/mm2, no ulceration or regression, mitotic rate 0-1/mm2, margins negative but close. Associated intradermal melanocytic nevus. Tumor stage: pT1a\par 2) Epigastric shave bx: Compound dysplastic melanocytic nevus with severe atypia, closely approaches the deep (0.7 mm) and peripheral (0.8 mm) margins. An evolving melanoma in situ cannot be entirely excluded. \par \par Ms. GREENE reports a longstanding history of sun exposure without the use of sunblock. History of sunburns.  Denies any other concerning lesions or masses. Denies bleeding or pruritic moles. Denies pain or constitutional symptoms.\par \par PMH: Thyroid nodules, uterine leiomyoma, HTN, HLD, post nasal drip, \par PSH: , cholecystectomy, D&C\par Social Hx: , works at [employer], 1 daughter, twin boy and girl, never a smoker, social alcohol use. \par Family Hx: Denies family history of malignancies \par \par ***ON ASA 81 mg daily****\par \par INTERVAL HISTORY:\par ***OFFICE PROCEDURE 2021- S/p WLE of right lateral upper back T1 (0.3 mm) melanoma and epigastric melanocytic nevus with severe atypia. \par ***FINAL PATHOLOGY:  Right lateral upper back excision- (Fibrosing granulation tissue, no evidence of residual melanoma).  Epigastric excision- (Fibrosing granulation tissue, no evidence of residual compound melanocytic nevus with severe atypia). \par \par 4/15/2021- Denies pain, fever or chills. Doing well. \par \par 21: Doing well.  No issues.\par \par 10/28/2021- Seen by Dr. Lan on 2021 with no new biopsies.  Doing well with no complaints. Denies new skin lesions or masses. \par \par 22- Seen by Dr. Lan on 2021 with no new biopsies and  has follow-up appt on 2022.  Doing well with no complaints.

## 2022-04-28 NOTE — ASSESSMENT
[FreeTextEntry1] : 50 y/o female with PMHx HTN with newly diagnosed melanoma. \par \par ***OFFICE PROCEDURE 4/2/2021- S/p WLE of right lateral upper back T1 (0.3 mm) melanoma and epigastric melanocytic nevus with severe atypia. \par ***FINAL PATHOLOGY:  Right lateral upper back excision- (Fibrosing granulation tissue, no evidence of residual melanoma).  Epigastric excision- (Fibrosing granulation tissue, no evidence of residual compound melanocytic nevus with severe atypia).\par \par No evidence of recurrence \par \par PLAN:\par 1) RTO 6 months\par 2) Continue f/u with dermatology for full body skin checks

## 2022-04-28 NOTE — CONSULT LETTER
[Dear  ___] : Dear  [unfilled], [Courtesy Letter:] : I had the pleasure of seeing your patient, [unfilled], in my office today. [Please see my note below.] : Please see my note below. [Consult Closing:] : Thank you very much for allowing me to participate in the care of this patient.  If you have any questions, please do not hesitate to contact me. [Sincerely,] : Sincerely, [FreeTextEntry3] : Jatinder Clark MD, MPH, FACS, FSSO\par , [facility] General Surgical Oncology Fellowship\par [facility] [place]\par Associate Professor of Surgery\par [employer] at [facility]

## 2022-06-02 ENCOUNTER — APPOINTMENT (OUTPATIENT)
Dept: DERMATOLOGY | Facility: CLINIC | Age: 52
End: 2022-06-02
Payer: COMMERCIAL

## 2022-06-02 PROCEDURE — 17110 DESTRUCTION B9 LES UP TO 14: CPT

## 2022-06-02 PROCEDURE — 99213 OFFICE O/P EST LOW 20 MIN: CPT | Mod: 25

## 2022-08-09 ENCOUNTER — APPOINTMENT (OUTPATIENT)
Dept: OBGYN | Facility: CLINIC | Age: 52
End: 2022-08-09

## 2022-08-09 ENCOUNTER — NON-APPOINTMENT (OUTPATIENT)
Age: 52
End: 2022-08-09

## 2022-08-09 VITALS
DIASTOLIC BLOOD PRESSURE: 80 MMHG | SYSTOLIC BLOOD PRESSURE: 124 MMHG | BODY MASS INDEX: 29.03 KG/M2 | HEIGHT: 67 IN | WEIGHT: 185 LBS

## 2022-08-09 DIAGNOSIS — N63.20 UNSPECIFIED LUMP IN THE LEFT BREAST, UNSPECIFIED QUADRANT: ICD-10-CM

## 2022-08-09 PROCEDURE — 99396 PREV VISIT EST AGE 40-64: CPT

## 2022-08-09 PROCEDURE — 99212 OFFICE O/P EST SF 10 MIN: CPT | Mod: 25

## 2022-08-09 NOTE — PHYSICAL EXAM
[Chaperone Declined] : Patient declined chaperone [Appropriately responsive] : appropriately responsive [Alert] : alert [No Acute Distress] : no acute distress [No Lymphadenopathy] : no lymphadenopathy [Regular Rate Rhythm] : regular rate rhythm [No Murmurs] : no murmurs [Clear to Auscultation B/L] : clear to auscultation bilaterally [Soft] : soft [Non-tender] : non-tender [Non-distended] : non-distended [No HSM] : No HSM [No Lesions] : no lesions [No Mass] : no mass [Oriented x3] : oriented x3 [Examination Of The Breasts] : a normal appearance [Breast Mass Right Breast ___cm] : no was mass palpable [Labia Majora] : normal [Labia Minora] : normal [Normal] : normal [Uterine Adnexae] : normal

## 2022-08-09 NOTE — PLAN
[FreeTextEntry1] : 51 year old female wwe\par \par 1. Pap done\par 2.  Left breast mass.  Differential diagnosis reviewed with the patient.  Rx was provided for a right screening mammogram and left diagnostic mammogram.  She was also given a prescription for bilateral breast sonogram.\par 3. Patient plans to have colonoscopy done\par 4. Counseled on perimenopause/ menopause\par 5. Annual exam in 1 year

## 2022-08-15 LAB
CYTOLOGY CVX/VAG DOC THIN PREP: NORMAL
HPV HIGH+LOW RISK DNA PNL CVX: NOT DETECTED

## 2022-10-27 ENCOUNTER — APPOINTMENT (OUTPATIENT)
Dept: SURGICAL ONCOLOGY | Facility: CLINIC | Age: 52
End: 2022-10-27

## 2022-10-27 VITALS
HEART RATE: 73 BPM | WEIGHT: 184 LBS | BODY MASS INDEX: 28.88 KG/M2 | HEIGHT: 67 IN | OXYGEN SATURATION: 95 % | TEMPERATURE: 98 F | DIASTOLIC BLOOD PRESSURE: 95 MMHG | SYSTOLIC BLOOD PRESSURE: 144 MMHG

## 2022-10-27 PROCEDURE — 99213 OFFICE O/P EST LOW 20 MIN: CPT

## 2022-12-23 NOTE — PHYSICAL EXAM
[Normal] : supple, no neck mass and thyroid not enlarged [Normal Neck Lymph Nodes] : normal neck lymph nodes  [Normal Supraclavicular Lymph Nodes] : normal supraclavicular lymph nodes [Normal Axillary Lymph Nodes] : normal axillary lymph nodes [Normal] : oriented to person, place and time, with appropriate affect [de-identified] : healed epigastric incision with no evidence of recurrence; healed, hypertrophic right upper lateral back incision with no evidence of recurrence

## 2022-12-23 NOTE — HISTORY OF PRESENT ILLNESS
[de-identified] : Ms. ANTHONY GREENE  is a 52 year old female presenting for a 6 month follow up visit. \par \par Was seen in initial consultation for newly diagnosed melanoma on 3/11/2021, referred by Dr. Lan.  \par \par She underwent a routine dermatological skin exam on 2021 and 2 shave biopsies performed with the following pathology:\par 1) Right lateral upper back shave bx: Melanoma, 0.3 mm in thickness, mitotic rate 0-1/mm2, no ulceration or regression, mitotic rate 0-1/mm2, margins negative but close. Associated intradermal melanocytic nevus. Tumor stage: pT1a\par 2) Epigastric shave bx: Compound dysplastic melanocytic nevus with severe atypia, closely approaches the deep (0.7 mm) and peripheral (0.8 mm) margins. An evolving melanoma in situ cannot be entirely excluded. \par \par Ms. GREENE reports a longstanding history of sun exposure without the use of sunblock. History of sunburns.  Denies any other concerning lesions or masses. Denies bleeding or pruritic moles. Denies pain or constitutional symptoms.\par \par PMH: Thyroid nodules, uterine leiomyoma, HTN, HLD, post nasal drip, \par PSH: , cholecystectomy, D&C\par Social Hx: , works at [employer], 1 daughter, twin boy and girl, never a smoker, social alcohol use. \par Family Hx: Denies family history of malignancies \par \par ***ON ASA 81 mg daily****\par \par INTERVAL HISTORY:\par ***OFFICE PROCEDURE 2021- S/p WLE of right lateral upper back T1 (0.3 mm) melanoma and epigastric melanocytic nevus with severe atypia. \par ***FINAL PATHOLOGY:  Right lateral upper back excision- (Fibrosing granulation tissue, no evidence of residual melanoma).  Epigastric excision- (Fibrosing granulation tissue, no evidence of residual compound melanocytic nevus with severe atypia). \par \par 4/15/2021- Denies pain, fever or chills. Doing well. \par \par 21: Doing well.  No issues.\par \par 10/28/2021- Seen by Dr. Lan on 2021 with no new biopsies.  Doing well with no complaints. Denies new skin lesions or masses. \par \par 22- Seen by Dr. Lan on 2021 with no new biopsies and  has follow-up appt on 2022.  Doing well with no complaints. \par \par 10/27/22- Denies any concerning lesions or masses. Denies bleeding or pruritic moles. Denies pain or constitutional changes. Last seen Dr. Lan in may '22 with no new biopsies taken. She is scheduled to see Dr. San ([facility] dermatology) in  and Dr. Lan in January. \par

## 2022-12-23 NOTE — CONSULT LETTER
[Dear  ___] : Dear  [unfilled], [Courtesy Letter:] : I had the pleasure of seeing your patient, [unfilled], in my office today. [Please see my note below.] : Please see my note below. [Consult Closing:] : Thank you very much for allowing me to participate in the care of this patient.  If you have any questions, please do not hesitate to contact me. [Sincerely,] : Sincerely, [FreeTextEntry3] : Jatinder Clark MD, MPH, FACS, FSSO\par , [facility] General Surgical Oncology Fellowship\par [facility] [place]\par Associate Professor of Surgery\par [employer] at [facility]

## 2022-12-23 NOTE — ASSESSMENT
[FreeTextEntry1] : 53 y/o female for melanoma follow up. \par \par ***OFFICE PROCEDURE 4/2/2021- S/p WLE of right lateral upper back T1 (0.3 mm) melanoma and epigastric melanocytic nevus with severe atypia. \par ***FINAL PATHOLOGY:  Right lateral upper back excision- (Fibrosing granulation tissue, no evidence of residual melanoma).  Epigastric excision- (Fibrosing granulation tissue, no evidence of residual compound melanocytic nevus with severe atypia).\par \par No evidence of recurrence \par \par PLAN:\par 1) RTO 6 months for 2 years than every year for a total of 5 years\par 2) Continue f/u with dermatology for full body skin checks

## 2023-02-09 ENCOUNTER — APPOINTMENT (OUTPATIENT)
Dept: DERMATOLOGY | Facility: CLINIC | Age: 53
End: 2023-02-09
Payer: COMMERCIAL

## 2023-02-09 PROCEDURE — 99213 OFFICE O/P EST LOW 20 MIN: CPT

## 2023-04-20 ENCOUNTER — APPOINTMENT (OUTPATIENT)
Dept: SURGICAL ONCOLOGY | Facility: CLINIC | Age: 53
End: 2023-04-20
Payer: COMMERCIAL

## 2023-04-20 VITALS
DIASTOLIC BLOOD PRESSURE: 108 MMHG | OXYGEN SATURATION: 100 % | BODY MASS INDEX: 29.03 KG/M2 | HEIGHT: 67 IN | TEMPERATURE: 97.2 F | SYSTOLIC BLOOD PRESSURE: 174 MMHG | WEIGHT: 185 LBS | HEART RATE: 64 BPM

## 2023-04-20 DIAGNOSIS — C43.59 MALIGNANT MELANOMA OF OTHER PART OF TRUNK: ICD-10-CM

## 2023-04-20 PROCEDURE — 99213 OFFICE O/P EST LOW 20 MIN: CPT

## 2023-04-21 NOTE — ASSESSMENT
[FreeTextEntry1] : 51 y/o female for melanoma follow up. \par \par ***OFFICE PROCEDURE 4/2/2021- S/p WLE of right lateral upper back T1 (0.3 mm) melanoma and epigastric melanocytic nevus with severe atypia. \par ***FINAL PATHOLOGY:  Right lateral upper back excision- (Fibrosing granulation tissue, no evidence of residual melanoma).  Epigastric excision- (Fibrosing granulation tissue, no evidence of residual compound melanocytic nevus with severe atypia).\par \par No evidence of recurrence \par \par PLAN:\par 1) Return in one year for a total of 5 years\par 2) Continue f/u with dermatology for full body skin checks

## 2023-04-21 NOTE — CONSULT LETTER
[Dear  ___] : Dear  [unfilled], [Courtesy Letter:] : I had the pleasure of seeing your patient, [unfilled], in my office today. [Please see my note below.] : Please see my note below. [Consult Closing:] : Thank you very much for allowing me to participate in the care of this patient.  If you have any questions, please do not hesitate to contact me. [Sincerely,] : Sincerely, [FreeTextEntry3] : Jatinder Clark MD, MPH, FACS, FSSO\par , [facility] General Surgical Oncology Fellowship\par [facility] [place]\par Associate Professor of Surgery\par [employer] at [facility]

## 2023-04-21 NOTE — PHYSICAL EXAM
[Normal] : supple, no neck mass and thyroid not enlarged [Normal Neck Lymph Nodes] : normal neck lymph nodes  [Normal Supraclavicular Lymph Nodes] : normal supraclavicular lymph nodes [Normal Axillary Lymph Nodes] : normal axillary lymph nodes [Normal] : oriented to person, place and time, with appropriate affect [de-identified] : healed epigastric incision with no evidence of recurrence; healed, hypertrophic right upper lateral back incision with no evidence of recurrence

## 2023-04-21 NOTE — ADDENDUM
[FreeTextEntry1] : Documented by Hannah Roque acting as scribe for Dr. Clark on 04/20/2023 \par \par All Medical record entries made by the Scribe were at my, Dr. Clark's , direction and personally dictated by me on 04/20/2023 . I have reviewed the chart and agree that the record accurately reflects my personal performance of the history, physical exam, assessment and plan. I have also personally directed, reviewed, and agreed with the discharge instructions.

## 2023-04-21 NOTE — HISTORY OF PRESENT ILLNESS
[de-identified] : Ms. ANTHONY GREENE  is a 52 year old female presenting for a 6 month follow up visit. \par \par Was seen in initial consultation for newly diagnosed melanoma on 3/11/2021, referred by Dr. Lan.  \par \par She underwent a routine dermatological skin exam on 2021 and 2 shave biopsies performed with the following pathology:\par 1) Right lateral upper back shave bx: Melanoma, 0.3 mm in thickness, mitotic rate 0-1/mm2, no ulceration or regression, mitotic rate 0-1/mm2, margins negative but close. Associated intradermal melanocytic nevus. Tumor stage: pT1a\par 2) Epigastric shave bx: Compound dysplastic melanocytic nevus with severe atypia, closely approaches the deep (0.7 mm) and peripheral (0.8 mm) margins. An evolving melanoma in situ cannot be entirely excluded. \par \par Ms. GREENE reports a longstanding history of sun exposure without the use of sunblock. History of sunburns.  Denies any other concerning lesions or masses. Denies bleeding or pruritic moles. Denies pain or constitutional symptoms.\par \par PMH: Thyroid nodules, uterine leiomyoma, HTN, HLD, post nasal drip, \par PSH: , cholecystectomy, D&C\par Social Hx: , works at [employer], 1 daughter, twin boy and girl, never a smoker, social alcohol use. \par Family Hx: Denies family history of malignancies \par \par ***ON ASA 81 mg daily****\par \par INTERVAL HISTORY:\par ***OFFICE PROCEDURE 2021- S/p WLE of right lateral upper back T1 (0.3 mm) melanoma and epigastric melanocytic nevus with severe atypia. \par ***FINAL PATHOLOGY:  Right lateral upper back excision- (Fibrosing granulation tissue, no evidence of residual melanoma).  Epigastric excision- (Fibrosing granulation tissue, no evidence of residual compound melanocytic nevus with severe atypia). \par \par 4/15/2021- Denies pain, fever or chills. Doing well. \par \par 21: Doing well.  No issues.\par \par 10/28/2021- Seen by Dr. Lan on 2021 with no new biopsies.  Doing well with no complaints. Denies new skin lesions or masses. \par \par 22- Seen by Dr. Lan on 2021 with no new biopsies and  has follow-up appt on 2022.  Doing well with no complaints. \par \par 10/27/22- Denies any concerning lesions or masses. Denies bleeding or pruritic moles. Denies pain or constitutional changes. Last seen Dr. Lan in may '22 with no new biopsies taken. She is scheduled to see Dr. San ([facility] dermatology) in  and Dr. Lan in January. \par \par 23- Denies any concerning lesions or masses. Denies bleeding or pruritic moles. Denies pain or constitutional changes. Continues to follow up with dermatology with no new biopsies taken. Last saw Dr. Lan in February and Dr. San in November\par

## 2023-08-09 ENCOUNTER — APPOINTMENT (OUTPATIENT)
Dept: DERMATOLOGY | Facility: CLINIC | Age: 53
End: 2023-08-09
Payer: COMMERCIAL

## 2023-08-09 PROCEDURE — 99213 OFFICE O/P EST LOW 20 MIN: CPT | Mod: 25

## 2023-08-09 PROCEDURE — 11102 TANGNTL BX SKIN SINGLE LES: CPT

## 2023-09-06 ENCOUNTER — TRANSCRIPTION ENCOUNTER (OUTPATIENT)
Age: 53
End: 2023-09-06

## 2023-09-20 ENCOUNTER — APPOINTMENT (OUTPATIENT)
Dept: OBGYN | Facility: CLINIC | Age: 53
End: 2023-09-20
Payer: COMMERCIAL

## 2023-09-20 VITALS
SYSTOLIC BLOOD PRESSURE: 183 MMHG | BODY MASS INDEX: 29.27 KG/M2 | DIASTOLIC BLOOD PRESSURE: 103 MMHG | HEIGHT: 67 IN | WEIGHT: 186.5 LBS

## 2023-09-20 DIAGNOSIS — Z01.419 ENCOUNTER FOR GYNECOLOGICAL EXAMINATION (GENERAL) (ROUTINE) W/OUT ABNORMAL FINDINGS: ICD-10-CM

## 2023-09-20 PROCEDURE — 99396 PREV VISIT EST AGE 40-64: CPT

## 2023-09-26 LAB
CYTOLOGY CVX/VAG DOC THIN PREP: NORMAL
HPV HIGH+LOW RISK DNA PNL CVX: NOT DETECTED

## 2023-10-16 ENCOUNTER — RESULT REVIEW (OUTPATIENT)
Age: 53
End: 2023-10-16

## 2023-10-16 ENCOUNTER — APPOINTMENT (OUTPATIENT)
Dept: DERMATOLOGY | Facility: CLINIC | Age: 53
End: 2023-10-16
Payer: COMMERCIAL

## 2023-10-16 DIAGNOSIS — D48.5 NEOPLASM OF UNCERTAIN BEHAVIOR OF SKIN: ICD-10-CM

## 2023-10-16 PROCEDURE — 12032 INTMD RPR S/A/T/EXT 2.6-7.5: CPT

## 2023-10-16 PROCEDURE — 11403 EXC TR-EXT B9+MARG 2.1-3CM: CPT

## 2023-10-16 RX ORDER — MUPIROCIN 20 MG/G
2 OINTMENT TOPICAL TWICE DAILY
Qty: 1 | Refills: 6 | Status: ACTIVE | COMMUNITY
Start: 2023-10-16 | End: 1900-01-01

## 2024-02-29 ENCOUNTER — APPOINTMENT (OUTPATIENT)
Dept: DERMATOLOGY | Facility: CLINIC | Age: 54
End: 2024-02-29
Payer: COMMERCIAL

## 2024-02-29 PROCEDURE — 99213 OFFICE O/P EST LOW 20 MIN: CPT

## 2024-04-18 ENCOUNTER — APPOINTMENT (OUTPATIENT)
Dept: SURGICAL ONCOLOGY | Facility: CLINIC | Age: 54
End: 2024-04-18
Payer: COMMERCIAL

## 2024-04-18 VITALS
HEART RATE: 76 BPM | SYSTOLIC BLOOD PRESSURE: 166 MMHG | DIASTOLIC BLOOD PRESSURE: 105 MMHG | BODY MASS INDEX: 29.2 KG/M2 | HEIGHT: 66.93 IN | OXYGEN SATURATION: 97 % | WEIGHT: 186.05 LBS

## 2024-04-18 PROCEDURE — 99213 OFFICE O/P EST LOW 20 MIN: CPT

## 2024-04-18 NOTE — ASSESSMENT
[FreeTextEntry1] : 54 y/o F with a pT1a melanoma s/p WLE in 2021, here for follow up in year 3. No evidence of recurrence.  PLAN: 1) Return in one year for a total of 5 years 2) Continue f/u with dermatology for full body skin checks.  Jesus Montelongo MD  Assistant Professor of Surgery [employer] at [facility] Division of Surgical Oncology [facility] Phone: [phone] Fax: [phone]  I spent 30 minutes reviewing the patient's chart, labs, imaging, interviewing and examining patient, and discussing plan of care with the patient, resident/PA team, and other providers, excluding separately billable procedures and teaching time.

## 2024-04-18 NOTE — HISTORY OF PRESENT ILLNESS
[de-identified] : Ms. Carr is a 52 y/o F who presents for follow up after melanoma excision in 2021.  ***OFFICE PROCEDURE 4/2/2021- S/p WLE of right lateral upper back T1 (0.3 mm) melanoma and epigastric melanocytic nevus with severe atypia. ***FINAL PATHOLOGY: Right lateral upper back excision- (Fibrosing granulation tissue, no evidence of residual melanoma). Epigastric excision- (Fibrosing granulation tissue, no evidence of residual compound melanocytic nevus with severe atypia).  She has been following up yearly, and presents for follow up this year (year 3). Doing well. No evidence on recurrence on exam or clinically.

## 2024-04-18 NOTE — PHYSICAL EXAM
[Normal] : full range of motion and no deformities appreciated [de-identified] : RRR [de-identified] : Easy WOB [de-identified] : R Shoulder incision site is soft, flat, healing well. No evidence of recurrence or in-transit lesions.

## 2024-04-18 NOTE — REASON FOR VISIT
[Follow-Up Visit] : a follow-up visit for [Referred By: ___] : Referred By: [unfilled] [FreeTextEntry2] : melanoma follow up

## 2024-05-09 NOTE — H&P PST ADULT - RS GEN PE MLT RESP DETAILS PC
normal/diminished breath sounds, R/airway patent/diminished breath sounds, L/respirations non-labored never

## 2024-08-30 ENCOUNTER — NON-APPOINTMENT (OUTPATIENT)
Age: 54
End: 2024-08-30

## 2024-10-24 ENCOUNTER — APPOINTMENT (OUTPATIENT)
Dept: DERMATOLOGY | Facility: CLINIC | Age: 54
End: 2024-10-24
Payer: COMMERCIAL

## 2024-10-24 PROCEDURE — 99213 OFFICE O/P EST LOW 20 MIN: CPT

## 2024-10-29 ENCOUNTER — APPOINTMENT (OUTPATIENT)
Dept: OBGYN | Facility: CLINIC | Age: 54
End: 2024-10-29
Payer: COMMERCIAL

## 2024-10-29 ENCOUNTER — NON-APPOINTMENT (OUTPATIENT)
Age: 54
End: 2024-10-29

## 2024-10-29 VITALS
DIASTOLIC BLOOD PRESSURE: 100 MMHG | WEIGHT: 181 LBS | HEIGHT: 67 IN | SYSTOLIC BLOOD PRESSURE: 160 MMHG | BODY MASS INDEX: 28.41 KG/M2

## 2024-10-29 VITALS — DIASTOLIC BLOOD PRESSURE: 88 MMHG | SYSTOLIC BLOOD PRESSURE: 132 MMHG

## 2024-10-29 DIAGNOSIS — Z01.419 ENCOUNTER FOR GYNECOLOGICAL EXAMINATION (GENERAL) (ROUTINE) W/OUT ABNORMAL FINDINGS: ICD-10-CM

## 2024-10-29 PROCEDURE — 99396 PREV VISIT EST AGE 40-64: CPT

## 2024-11-05 LAB
CYTOLOGY CVX/VAG DOC THIN PREP: NORMAL
HPV HIGH+LOW RISK DNA PNL CVX: NOT DETECTED

## 2025-04-24 ENCOUNTER — APPOINTMENT (OUTPATIENT)
Dept: SURGICAL ONCOLOGY | Facility: CLINIC | Age: 55
End: 2025-04-24

## 2025-05-09 ENCOUNTER — APPOINTMENT (OUTPATIENT)
Dept: DERMATOLOGY | Facility: CLINIC | Age: 55
End: 2025-05-09
Payer: COMMERCIAL

## 2025-05-09 PROCEDURE — 99213 OFFICE O/P EST LOW 20 MIN: CPT | Mod: 25

## 2025-05-09 PROCEDURE — 11102 TANGNTL BX SKIN SINGLE LES: CPT

## 2025-06-23 ENCOUNTER — NON-APPOINTMENT (OUTPATIENT)
Age: 55
End: 2025-06-23

## 2025-06-23 ENCOUNTER — RESULT REVIEW (OUTPATIENT)
Age: 55
End: 2025-06-23

## 2025-06-23 ENCOUNTER — APPOINTMENT (OUTPATIENT)
Dept: DERMATOLOGY | Facility: CLINIC | Age: 55
End: 2025-06-23
Payer: COMMERCIAL

## 2025-06-23 PROBLEM — D22.9 ATYPICAL NEVUS: Status: ACTIVE | Noted: 2025-06-23

## 2025-06-23 PROCEDURE — 12032 INTMD RPR S/A/T/EXT 2.6-7.5: CPT

## 2025-06-23 PROCEDURE — 11403 EXC TR-EXT B9+MARG 2.1-3CM: CPT

## 2025-06-24 RX ORDER — MUPIROCIN 20 MG/G
2 OINTMENT TOPICAL TWICE DAILY
Qty: 1 | Refills: 6 | Status: ACTIVE | COMMUNITY
Start: 2025-06-24 | End: 1900-01-01